# Patient Record
Sex: MALE | Race: WHITE | NOT HISPANIC OR LATINO | Employment: FULL TIME | URBAN - METROPOLITAN AREA
[De-identification: names, ages, dates, MRNs, and addresses within clinical notes are randomized per-mention and may not be internally consistent; named-entity substitution may affect disease eponyms.]

---

## 2021-06-25 ENCOUNTER — HOSPITAL ENCOUNTER (OUTPATIENT)
Dept: LAB | Facility: MEDICAL CENTER | Age: 36
End: 2021-06-25
Attending: STUDENT IN AN ORGANIZED HEALTH CARE EDUCATION/TRAINING PROGRAM
Payer: COMMERCIAL

## 2021-06-25 LAB
ALBUMIN SERPL BCP-MCNC: 4.6 G/DL (ref 3.2–4.9)
ALBUMIN/GLOB SERPL: 2.3 G/DL
ALP SERPL-CCNC: 64 U/L (ref 30–99)
ALT SERPL-CCNC: 21 U/L (ref 2–50)
ANION GAP SERPL CALC-SCNC: 9 MMOL/L (ref 7–16)
AST SERPL-CCNC: 22 U/L (ref 12–45)
BILIRUB SERPL-MCNC: 0.3 MG/DL (ref 0.1–1.5)
BUN SERPL-MCNC: 13 MG/DL (ref 8–22)
CALCIUM SERPL-MCNC: 9.2 MG/DL (ref 8.5–10.5)
CHLORIDE SERPL-SCNC: 108 MMOL/L (ref 96–112)
CHOLEST SERPL-MCNC: 144 MG/DL (ref 100–199)
CO2 SERPL-SCNC: 28 MMOL/L (ref 20–33)
CREAT SERPL-MCNC: 1.07 MG/DL (ref 0.5–1.4)
ERYTHROCYTE [DISTWIDTH] IN BLOOD BY AUTOMATED COUNT: 41.4 FL (ref 35.9–50)
FASTING STATUS PATIENT QL REPORTED: NORMAL
GLOBULIN SER CALC-MCNC: 2 G/DL (ref 1.9–3.5)
GLUCOSE SERPL-MCNC: 101 MG/DL (ref 65–99)
HCT VFR BLD AUTO: 49.6 % (ref 42–52)
HDLC SERPL-MCNC: 61 MG/DL
HGB BLD-MCNC: 16.3 G/DL (ref 14–18)
LDLC SERPL CALC-MCNC: 76 MG/DL
MCH RBC QN AUTO: 31.2 PG (ref 27–33)
MCHC RBC AUTO-ENTMCNC: 32.9 G/DL (ref 33.7–35.3)
MCV RBC AUTO: 94.8 FL (ref 81.4–97.8)
PLATELET # BLD AUTO: 204 K/UL (ref 164–446)
PMV BLD AUTO: 11.7 FL (ref 9–12.9)
POTASSIUM SERPL-SCNC: 4.4 MMOL/L (ref 3.6–5.5)
PROT SERPL-MCNC: 6.6 G/DL (ref 6–8.2)
RBC # BLD AUTO: 5.23 M/UL (ref 4.7–6.1)
SODIUM SERPL-SCNC: 145 MMOL/L (ref 135–145)
TRIGL SERPL-MCNC: 34 MG/DL (ref 0–149)
WBC # BLD AUTO: 6.3 K/UL (ref 4.8–10.8)

## 2021-06-25 PROCEDURE — 85027 COMPLETE CBC AUTOMATED: CPT

## 2021-06-25 PROCEDURE — 80053 COMPREHEN METABOLIC PANEL: CPT

## 2021-06-25 PROCEDURE — 80061 LIPID PANEL: CPT

## 2021-06-25 PROCEDURE — 36415 COLL VENOUS BLD VENIPUNCTURE: CPT

## 2021-11-20 ENCOUNTER — HOSPITAL ENCOUNTER (OUTPATIENT)
Dept: LAB | Facility: MEDICAL CENTER | Age: 36
End: 2021-11-20
Attending: STUDENT IN AN ORGANIZED HEALTH CARE EDUCATION/TRAINING PROGRAM
Payer: COMMERCIAL

## 2021-11-20 LAB
BASOPHILS # BLD AUTO: 0.4 % (ref 0–1.8)
BASOPHILS # BLD: 0.03 K/UL (ref 0–0.12)
CRP SERPL HS-MCNC: <0.3 MG/DL (ref 0–0.75)
EOSINOPHIL # BLD AUTO: 0.05 K/UL (ref 0–0.51)
EOSINOPHIL NFR BLD: 0.7 % (ref 0–6.9)
ERYTHROCYTE [DISTWIDTH] IN BLOOD BY AUTOMATED COUNT: 41 FL (ref 35.9–50)
ERYTHROCYTE [SEDIMENTATION RATE] IN BLOOD BY WESTERGREN METHOD: 2 MM/HOUR (ref 0–20)
HCT VFR BLD AUTO: 49.4 % (ref 42–52)
HGB BLD-MCNC: 17 G/DL (ref 14–18)
IMM GRANULOCYTES # BLD AUTO: 0.01 K/UL (ref 0–0.11)
IMM GRANULOCYTES NFR BLD AUTO: 0.1 % (ref 0–0.9)
LYMPHOCYTES # BLD AUTO: 1.91 K/UL (ref 1–4.8)
LYMPHOCYTES NFR BLD: 28 % (ref 22–41)
MCH RBC QN AUTO: 31.8 PG (ref 27–33)
MCHC RBC AUTO-ENTMCNC: 34.4 G/DL (ref 33.7–35.3)
MCV RBC AUTO: 92.5 FL (ref 81.4–97.8)
MONOCYTES # BLD AUTO: 0.62 K/UL (ref 0–0.85)
MONOCYTES NFR BLD AUTO: 9.1 % (ref 0–13.4)
NEUTROPHILS # BLD AUTO: 4.21 K/UL (ref 1.82–7.42)
NEUTROPHILS NFR BLD: 61.7 % (ref 44–72)
NRBC # BLD AUTO: 0 K/UL
NRBC BLD-RTO: 0 /100 WBC
PLATELET # BLD AUTO: 256 K/UL (ref 164–446)
PMV BLD AUTO: 11.7 FL (ref 9–12.9)
RBC # BLD AUTO: 5.34 M/UL (ref 4.7–6.1)
RHEUMATOID FACT SER IA-ACNC: <10 IU/ML (ref 0–14)
URATE SERPL-MCNC: 5.3 MG/DL (ref 2.5–8.3)
WBC # BLD AUTO: 6.8 K/UL (ref 4.8–10.8)

## 2021-11-20 PROCEDURE — 86140 C-REACTIVE PROTEIN: CPT

## 2021-11-20 PROCEDURE — 86812 HLA TYPING A B OR C: CPT

## 2021-11-20 PROCEDURE — 85652 RBC SED RATE AUTOMATED: CPT

## 2021-11-20 PROCEDURE — 86200 CCP ANTIBODY: CPT

## 2021-11-20 PROCEDURE — 86038 ANTINUCLEAR ANTIBODIES: CPT

## 2021-11-20 PROCEDURE — 85025 COMPLETE CBC W/AUTO DIFF WBC: CPT

## 2021-11-20 PROCEDURE — 86431 RHEUMATOID FACTOR QUANT: CPT

## 2021-11-20 PROCEDURE — 36415 COLL VENOUS BLD VENIPUNCTURE: CPT

## 2021-11-20 PROCEDURE — 84550 ASSAY OF BLOOD/URIC ACID: CPT

## 2021-11-23 LAB
CCP IGG SERPL-ACNC: 3 UNITS (ref 0–19)
HLA-B27 QL FC: NORMAL
NUCLEAR IGG SER QL IA: NORMAL

## 2021-12-28 ENCOUNTER — HOSPITAL ENCOUNTER (EMERGENCY)
Facility: MEDICAL CENTER | Age: 36
End: 2021-12-28
Attending: EMERGENCY MEDICINE
Payer: COMMERCIAL

## 2021-12-28 ENCOUNTER — HOSPITAL ENCOUNTER (EMERGENCY)
Facility: MEDICAL CENTER | Age: 36
End: 2021-12-28
Payer: COMMERCIAL

## 2021-12-28 VITALS
RESPIRATION RATE: 16 BRPM | HEIGHT: 71 IN | BODY MASS INDEX: 21.11 KG/M2 | TEMPERATURE: 98.2 F | SYSTOLIC BLOOD PRESSURE: 121 MMHG | HEART RATE: 62 BPM | WEIGHT: 150.79 LBS | OXYGEN SATURATION: 98 % | DIASTOLIC BLOOD PRESSURE: 72 MMHG

## 2021-12-28 VITALS
RESPIRATION RATE: 14 BRPM | SYSTOLIC BLOOD PRESSURE: 106 MMHG | DIASTOLIC BLOOD PRESSURE: 72 MMHG | HEART RATE: 84 BPM | OXYGEN SATURATION: 96 % | TEMPERATURE: 97.5 F

## 2021-12-28 DIAGNOSIS — B02.9 HERPES ZOSTER WITHOUT COMPLICATION: ICD-10-CM

## 2021-12-28 PROCEDURE — 99283 EMERGENCY DEPT VISIT LOW MDM: CPT

## 2021-12-28 PROCEDURE — 700101 HCHG RX REV CODE 250: Performed by: EMERGENCY MEDICINE

## 2021-12-28 PROCEDURE — 302449 STATCHG TRIAGE ONLY (STATISTIC)

## 2021-12-28 RX ORDER — PROPARACAINE HYDROCHLORIDE 5 MG/ML
1 SOLUTION/ DROPS OPHTHALMIC ONCE
Status: COMPLETED | OUTPATIENT
Start: 2021-12-28 | End: 2021-12-28

## 2021-12-28 RX ADMIN — PROPARACAINE HYDROCHLORIDE 1 DROP: 5 SOLUTION/ DROPS OPHTHALMIC at 14:00

## 2021-12-28 RX ADMIN — FLUORESCEIN SODIUM 1 MG: 1 STRIP OPHTHALMIC at 14:00

## 2021-12-28 ASSESSMENT — FIBROSIS 4 INDEX: FIB4 SCORE: 0.64

## 2021-12-28 NOTE — DISCHARGE INSTRUCTIONS
Call Dr. Hicks office today to make an appointment for tomorrow so they can follow this to ensure that there is no involvement of the eye.    Return for any change or worsening symptoms

## 2021-12-28 NOTE — ED TRIAGE NOTES
Shingles and currently on steroids and antivirals. Told to go to ER if affecting eye. Left eye affected.

## 2021-12-28 NOTE — ED PROVIDER NOTES
"ED Provider  Scribed for Juan Pablo Leos D.O. by Jovana Muhammad. 12/28/2021  1:34 PM    Means of arrival:walk-in  History obtained from:patient  History limited by: none    CHIEF COMPLAINT  Chief Complaint   Patient presents with    Herpes Zoster     Shingles to left face and now affecting left eye       [facility]  Cosme Zee is a 36 y.o. male who presents for evaluation of shingles exacerbation onset 4 days ago. Four days ago, Cosme developed a rash on his face. He went to Urgent care who gave him antivirals and prednisone for his shingles. He notes that the rash was painful but since taking his antivirals and steroid it has alleviated most of the pain. He was told that if it starts to affect his eyes he should come into the ED. Cosme has shingles rash on the left side of his face it is now right under his left eye. The rash is more puffy today according to Cosme. He denies any vision changes. He denies associated symptoms nausea, vomiting cough, fever, or chills. No exacerbating factors were reported.     REVIEW OF SYSTEMS  See HPI for further details. All other systems are negative.     PAST MEDICAL HISTORY       SOCIAL HISTORY  Social History     Tobacco Use    Smoking status: Never Smoker    Smokeless tobacco: Not reported   Vaping Use    Vaping Use: Never used   Substance and Sexual Activity    Alcohol use: Not Currently    Drug use: Never    Sexual activity: Not reported       SURGICAL HISTORY  patient denies any surgical history    CURRENT MEDICATIONS  Home Medications    **Home medications have not yet been reviewed for this encounter**         ALLERGIES  No Known Allergies    PHYSICAL EXAM  VITAL SIGNS: /69   Pulse 64   Temp 36.4 °C (97.5 °F) (Temporal)   Resp 16   Ht 1.803 m (5' 11\")   Wt 68.4 kg (150 lb 12.7 oz)   SpO2 97%   BMI 21.03 kg/m²   Constitutional: Alert in no apparent distress.  HENT: No signs of trauma, mucous membranes are moist. Erythematous vesicular  rash extending from " his left temple to the left maxillary, to his nose, and upper left eye  Eyes: Conjunctiva normal, Non-icteric. Pupil Equal and reactive, EOMI, Slit lamp exam shows anterior chamber deep and clear, No fluorescin dye uptake, No signs of lesions on the eye  Neck: Normal range of motion, No tenderness, Supple.  Lymphatic: No lymphadenopathy noted.   Cardiovascular: Regular rate and rhythm, no murmurs.   Thorax & Lungs: Normal breath sounds, No respiratory distress, No wheezing, No chest tenderness.   Abdomen: Bowel sounds normal, Soft, No tenderness, No masses, No pulsatile masses. No peritoneal signs.  Skin: Warm, Dry, normal color.   Back: No bony tenderness, No CVA tenderness.   Extremities: No edema, No tenderness, No cyanosis  Musculoskeletal: Good range of motion in all major joints. No tenderness to palpation or major deformities noted.   Neurologic: Alert and oriented x4, Normal motor function, Normal sensory function, No focal deficits noted.   Psychiatric: Affect normal, Judgment normal, Mood normal.     COURSE  Pertinent Labs & Imaging studies reviewed. (See chart for details)    1:34 PM - Patient seen and examined at bedside. Discussed plan of care. The patient will be  medicated with Fluorescein and Proparacaine 0.5% one drop for the slit eye exam. I informed the patient that his eye looks good. I will consult the ophthalmologist on call to see if they recommend anything else. Patient verbalizes understanding and agreement to this plan of care.      2:10 PM I discussed the patient's case and the above findings with Dr. Beaulieu (Ophthalmologist) who agrees to follow up with the patient tomorrow.    2:14 PM Patient was reevaluated at bedside. I informed the patient that I spoke to Dr. Beaulieu who wants to follow up with the patient tomorrow. Discussed plans for discharge. Patient verbalizes understanding and agreement to this plan of care.        MEDICAL DECISION MAKING  This is a 36 y.o. male who presents  shingles to the left face.  There is left periorbital swelling and lesions, his slit-lamp exam shows no dendritic lesions, no dye uptake he has no pain in the eye.  I spoke with ophthalmology and the patient will receive a close outpatient follow-up to evaluate for any ophthalmological emergencies.      The patient will return for new or worsening symptoms and is stable at the time of discharge.    The patient is referred to a primary physician for blood pressure management, diabetic screening, and for all other preventative health concerns.    DISPOSITION:  Patient will be discharged home in stable condition.    FOLLOW UP:  Anil Beaulieu M.D.  [address] [facility] [id number]  [facility] [address]  [phone]    In 1 day      FINAL IMPRESSION  1. Herpes zoster without complication         Jovana TOM (Scribe), am scribing for, and in the presence of, Juan Pablo Leos D.O..    Electronically signed by: Jovana Muhammad (Scribe), 12/28/2021    Juan Pablo TOM D.O. personally performed the services described in this documentation, as scribed by Jovana Muhammad in my presence, and it is both accurate and complete.    The note accurately reflects work and decisions made by me.  Juan Pablo Leos D.O.  12/28/2021  5:16 PM

## 2021-12-28 NOTE — ED NOTES
Pt sitting up in bed, family at bedside. Pt denies any pain or vision changes to L eye at this time.

## 2022-03-12 ENCOUNTER — HOSPITAL ENCOUNTER (OUTPATIENT)
Dept: LAB | Facility: MEDICAL CENTER | Age: 37
End: 2022-03-12
Attending: STUDENT IN AN ORGANIZED HEALTH CARE EDUCATION/TRAINING PROGRAM
Payer: COMMERCIAL

## 2022-03-12 LAB
ALBUMIN SERPL BCP-MCNC: 5 G/DL (ref 3.2–4.9)
ALBUMIN/GLOB SERPL: 2.9 G/DL
ALP SERPL-CCNC: 71 U/L (ref 30–99)
ALT SERPL-CCNC: 91 U/L (ref 2–50)
ANION GAP SERPL CALC-SCNC: 12 MMOL/L (ref 7–16)
AST SERPL-CCNC: 46 U/L (ref 12–45)
BILIRUB SERPL-MCNC: 0.4 MG/DL (ref 0.1–1.5)
BUN SERPL-MCNC: 17 MG/DL (ref 8–22)
CALCIUM SERPL-MCNC: 9.5 MG/DL (ref 8.5–10.5)
CHLORIDE SERPL-SCNC: 104 MMOL/L (ref 96–112)
CO2 SERPL-SCNC: 24 MMOL/L (ref 20–33)
CREAT SERPL-MCNC: 1.07 MG/DL (ref 0.5–1.4)
ERYTHROCYTE [SEDIMENTATION RATE] IN BLOOD BY WESTERGREN METHOD: 2 MM/HOUR (ref 0–20)
GLOBULIN SER CALC-MCNC: 1.7 G/DL (ref 1.9–3.5)
GLUCOSE SERPL-MCNC: 93 MG/DL (ref 65–99)
POTASSIUM SERPL-SCNC: 4.7 MMOL/L (ref 3.6–5.5)
PROT SERPL-MCNC: 6.7 G/DL (ref 6–8.2)
SODIUM SERPL-SCNC: 140 MMOL/L (ref 135–145)

## 2022-03-12 PROCEDURE — 85652 RBC SED RATE AUTOMATED: CPT

## 2022-03-12 PROCEDURE — 80053 COMPREHEN METABOLIC PANEL: CPT

## 2022-03-12 PROCEDURE — 36415 COLL VENOUS BLD VENIPUNCTURE: CPT

## 2022-03-12 PROCEDURE — 86038 ANTINUCLEAR ANTIBODIES: CPT

## 2022-03-15 LAB — NUCLEAR IGG SER QL IA: NORMAL

## 2022-03-29 ENCOUNTER — OFFICE VISIT (OUTPATIENT)
Dept: URGENT CARE | Facility: PHYSICIAN GROUP | Age: 37
End: 2022-03-29
Payer: COMMERCIAL

## 2022-03-29 VITALS
DIASTOLIC BLOOD PRESSURE: 70 MMHG | OXYGEN SATURATION: 98 % | SYSTOLIC BLOOD PRESSURE: 112 MMHG | TEMPERATURE: 97.9 F | HEART RATE: 70 BPM | WEIGHT: 146 LBS | RESPIRATION RATE: 16 BRPM | HEIGHT: 71 IN | BODY MASS INDEX: 20.44 KG/M2

## 2022-03-29 DIAGNOSIS — R10.9 RIGHT SIDED ABDOMINAL PAIN: ICD-10-CM

## 2022-03-29 LAB
APPEARANCE UR: CLEAR
BILIRUB UR STRIP-MCNC: NEGATIVE MG/DL
COLOR UR AUTO: YELLOW
GLUCOSE UR STRIP.AUTO-MCNC: NEGATIVE MG/DL
KETONES UR STRIP.AUTO-MCNC: NEGATIVE MG/DL
LEUKOCYTE ESTERASE UR QL STRIP.AUTO: NEGATIVE
NITRITE UR QL STRIP.AUTO: NEGATIVE
PH UR STRIP.AUTO: 7.5 [PH] (ref 5–8)
PROT UR QL STRIP: NEGATIVE MG/DL
RBC UR QL AUTO: NEGATIVE
SP GR UR STRIP.AUTO: 1.02
UROBILINOGEN UR STRIP-MCNC: 0.2 MG/DL

## 2022-03-29 PROCEDURE — 99204 OFFICE O/P NEW MOD 45 MIN: CPT | Performed by: PHYSICIAN ASSISTANT

## 2022-03-29 PROCEDURE — 81002 URINALYSIS NONAUTO W/O SCOPE: CPT | Performed by: PHYSICIAN ASSISTANT

## 2022-03-29 ASSESSMENT — ENCOUNTER SYMPTOMS
ABDOMINAL PAIN: 1
PALPITATIONS: 0
DIARRHEA: 0
FLANK PAIN: 0
NAUSEA: 0
VOMITING: 0
BLOOD IN STOOL: 0
CONSTIPATION: 0

## 2022-03-29 ASSESSMENT — PAIN SCALES - GENERAL: PAINLEVEL: 3=SLIGHT PAIN

## 2022-03-29 ASSESSMENT — FIBROSIS 4 INDEX: FIB4 SCORE: 0.64

## 2022-03-30 ENCOUNTER — HOSPITAL ENCOUNTER (OUTPATIENT)
Dept: RADIOLOGY | Facility: MEDICAL CENTER | Age: 37
End: 2022-03-30
Attending: PHYSICIAN ASSISTANT
Payer: COMMERCIAL

## 2022-03-30 DIAGNOSIS — R10.9 RIGHT SIDED ABDOMINAL PAIN: ICD-10-CM

## 2022-03-30 PROCEDURE — 76705 ECHO EXAM OF ABDOMEN: CPT

## 2022-03-30 NOTE — PROGRESS NOTES
"  Subjective:   Cosme Zee is a 36 y.o. male who presents today with   Chief Complaint   Patient presents with   • Abdominal Pain     Right side abdominal pain for 2 weeks.        Abdominal Pain  This is a new problem. The current episode started 1 to 4 weeks ago. The problem occurs constantly. The problem has been unchanged. The pain is located in the RLQ. The pain is mild. Pertinent negatives include no constipation, diarrhea, dysuria, frequency, hematuria, melena, nausea or vomiting. The pain is aggravated by certain positions. The pain is relieved by nothing. He has tried nothing for the symptoms.   Patient states he is very active and does rockclimbing.  He states when it initially started it was sharp pain when he was sitting and since then has been more of a dull pain to the area.  No worse with eating or drinking.  PMH:  has no past medical history on file.  MEDS: No current outpatient medications on file.  ALLERGIES: No Known Allergies  SURGHX: History reviewed. No pertinent surgical history.  SOCHX:  reports that he has never smoked. He has never used smokeless tobacco. He reports previous alcohol use. He reports that he does not use drugs.  FH: Reviewed with patient, not pertinent to this visit.     Review of Systems   Cardiovascular: Negative for chest pain and palpitations.   Gastrointestinal: Positive for abdominal pain. Negative for blood in stool, constipation, diarrhea, melena, nausea and vomiting.   Genitourinary: Negative for dysuria, flank pain, frequency, hematuria and urgency.      Objective:   /70   Pulse 70   Temp 36.6 °C (97.9 °F) (Temporal)   Resp 16   Ht 1.803 m (5' 11\")   Wt 66.2 kg (146 lb)   SpO2 98%   BMI 20.36 kg/m²   Physical Exam  Vitals and nursing note reviewed.   Constitutional:       General: He is not in acute distress.     Appearance: Normal appearance. He is well-developed. He is not ill-appearing or toxic-appearing.   HENT:      Head: Normocephalic and " atraumatic.      Right Ear: Hearing normal.      Left Ear: Hearing normal.   Eyes:      Pupils: Pupils are equal, round, and reactive to light.   Cardiovascular:      Rate and Rhythm: Normal rate and regular rhythm.      Heart sounds: Normal heart sounds.   Pulmonary:      Effort: Pulmonary effort is normal.   Abdominal:      Hernia: A hernia is present. Hernia is present in the ventral area.          Comments: Palpable bump to the right lower quadrant just outside the umbilical area.   Musculoskeletal:      Comments: Normal movement in all 4 extremities   Skin:     General: Skin is warm and dry.   Neurological:      Mental Status: He is alert.      Coordination: Coordination normal.   Psychiatric:         Mood and Affect: Mood normal.         UA negative  US pending    Assessment/Plan:   Assessment    1. Right sided abdominal pain  - POCT Urinalysis  - US-HERNIA ABDOMEN; Future  We will rule out any signs of hernia with ultrasound.  Follow-up with primary care ultrasound was negative and signs and symptoms or not improving.  Has follow-up on Friday with his primary care for labs at that time.  Unable to order ultrasound for today given late presentation.  Labs reviewed from 2 weeks ago and did not show any concerning findings that would be related.  Differential diagnosis, natural history, supportive care, and indications for immediate follow-up discussed.   Patient given instructions and understanding of medications and treatment.    If not improving in 3-5 days, F/U with PCP or return to  if symptoms worsen.    Patient agreeable to plan.      Please note that this dictation was created using voice recognition software. I have made every reasonable attempt to correct obvious errors, but I expect that there are errors of grammar and possibly content that I did not discover before finalizing the note.    Matt Perkins PA-C

## 2022-06-06 ENCOUNTER — OFFICE VISIT (OUTPATIENT)
Dept: URGENT CARE | Facility: CLINIC | Age: 37
End: 2022-06-06
Payer: COMMERCIAL

## 2022-06-06 VITALS
HEART RATE: 74 BPM | BODY MASS INDEX: 20.44 KG/M2 | OXYGEN SATURATION: 97 % | DIASTOLIC BLOOD PRESSURE: 70 MMHG | RESPIRATION RATE: 16 BRPM | SYSTOLIC BLOOD PRESSURE: 116 MMHG | TEMPERATURE: 97.3 F | WEIGHT: 146 LBS | HEIGHT: 71 IN

## 2022-06-06 DIAGNOSIS — R10.12 LUQ PAIN: ICD-10-CM

## 2022-06-06 DIAGNOSIS — K29.00 ACUTE GASTRITIS, PRESENCE OF BLEEDING UNSPECIFIED, UNSPECIFIED GASTRITIS TYPE: ICD-10-CM

## 2022-06-06 PROCEDURE — 99213 OFFICE O/P EST LOW 20 MIN: CPT | Performed by: NURSE PRACTITIONER

## 2022-06-06 RX ORDER — PANTOPRAZOLE SODIUM 40 MG/1
40 TABLET, DELAYED RELEASE ORAL DAILY
Qty: 14 TABLET | Refills: 1 | Status: SHIPPED | OUTPATIENT
Start: 2022-06-06 | End: 2022-06-20

## 2022-06-06 RX ORDER — SUCRALFATE 1 G/1
1 TABLET ORAL
Qty: 56 TABLET | Refills: 1 | Status: SHIPPED | OUTPATIENT
Start: 2022-06-06 | End: 2022-06-20

## 2022-06-06 ASSESSMENT — ENCOUNTER SYMPTOMS
RESPIRATORY NEGATIVE: 1
ABDOMINAL PAIN: 1
COUGH: 0
NAUSEA: 0
CONSTIPATION: 0
CHILLS: 0
CONSTITUTIONAL NEGATIVE: 1
SHORTNESS OF BREATH: 0
CARDIOVASCULAR NEGATIVE: 1
VOMITING: 0
HEARTBURN: 0
DIARRHEA: 0
FEVER: 0

## 2022-06-06 ASSESSMENT — FIBROSIS 4 INDEX: FIB4 SCORE: 0.64

## 2022-06-07 NOTE — PROGRESS NOTES
Subjective:     Cosme Zee is a 36 y.o. male who presents for LUQ Pain (X 2 wks and worse today, Lt. Upper abdominal pain.  No injury.)       LUQ Pain  This is a new problem. The problem has been gradually worsening. Pertinent negatives include no constipation, diarrhea, fever, nausea or vomiting.     Patient reporting left upper quadrant abdominal pain which radiates to his back.  Started 2 weeks ago.  Denies injury.  No nausea, vomiting, constipation, diarrhea, fever, chills, chest pain, shortness of breath, or other symptoms.    Seen in urgent care in March.  Had ultrasound of right lower quadrant due to pain.  Negative for hernia.    Patient was screened prior to rooming and denied COVID-19 diagnosis or contact with a person who has been diagnosed or is suspected to have COVID-19. During this visit, appropriate PPE was worn, hand hygiene was performed, and the patient and any visitors were masked.     PMH:  has no past medical history on file.    MEDS:   Current Outpatient Medications:   •  pantoprazole (PROTONIX) 40 MG Tablet Delayed Response, Take 1 Tablet by mouth every day for 14 days., Disp: 14 Tablet, Rfl: 1  •  sucralfate (CARAFATE) 1 GM Tab, Take 1 Tablet by mouth 4 Times a Day,Before Meals and at Bedtime for 14 days., Disp: 56 Tablet, Rfl: 1    ALLERGIES: No Known Allergies    SURGHX: History reviewed. No pertinent surgical history.    SOCHX:  reports that he has never smoked. He has never used smokeless tobacco. He reports previous alcohol use. He reports that he does not use drugs.     FH: Reviewed with patient, not pertinent to this visit.    Review of Systems   Constitutional: Negative.  Negative for chills, fever and malaise/fatigue.   Respiratory: Negative.  Negative for cough and shortness of breath.    Cardiovascular: Negative.  Negative for chest pain.   Gastrointestinal: Positive for abdominal pain. Negative for constipation, diarrhea, heartburn, nausea and vomiting.   All other  "systems reviewed and are negative.    Additional details per HPI.      Objective:     /70 (BP Location: Left arm, Patient Position: Sitting, BP Cuff Size: Adult)   Pulse 74   Temp 36.3 °C (97.3 °F) (Temporal)   Resp 16   Ht 1.803 m (5' 11\")   Wt 66.2 kg (146 lb)   SpO2 97%   BMI 20.36 kg/m²     Physical Exam  Vitals reviewed.   Constitutional:       General: He is not in acute distress.     Appearance: He is well-developed. He is not ill-appearing or toxic-appearing.   Eyes:      General: Vision grossly intact.      Extraocular Movements: Extraocular movements intact.   Cardiovascular:      Rate and Rhythm: Normal rate.   Pulmonary:      Effort: Pulmonary effort is normal. No respiratory distress.   Abdominal:      General: There is no distension.      Palpations: Abdomen is soft.      Tenderness: There is no abdominal tenderness.   Musculoskeletal:         General: No deformity. Normal range of motion.      Cervical back: Normal range of motion.   Skin:     General: Skin is warm and dry.      Coloration: Skin is not pale.   Neurological:      Mental Status: He is alert and oriented to person, place, and time.      Sensory: No sensory deficit.      Motor: No weakness.   Psychiatric:         Behavior: Behavior normal. Behavior is cooperative.       Assessment/Plan:     1. LUQ pain  - pantoprazole (PROTONIX) 40 MG Tablet Delayed Response; Take 1 Tablet by mouth every day for 14 days.  Dispense: 14 Tablet; Refill: 1  - sucralfate (CARAFATE) 1 GM Tab; Take 1 Tablet by mouth 4 Times a Day,Before Meals and at Bedtime for 14 days.  Dispense: 56 Tablet; Refill: 1    2. Acute gastritis, presence of bleeding unspecified, unspecified gastritis type  - pantoprazole (PROTONIX) 40 MG Tablet Delayed Response; Take 1 Tablet by mouth every day for 14 days.  Dispense: 14 Tablet; Refill: 1  - sucralfate (CARAFATE) 1 GM Tab; Take 1 Tablet by mouth 4 Times a Day,Before Meals and at Bedtime for 14 days.  Dispense: 56 Tablet; " Refill: 1    GI cocktail given. Patient reports improvement in symptoms.    Rx as above sent electronically.     Avoid/reduce alcohol, ibuprofen, spicy foods, caffeine, etc.    Differential diagnosis, natural history, supportive care, over-the-counter symptom management per 's instructions, close monitoring, and indications for immediate follow-up discussed.     All questions answered. Patient agrees with the plan of care.    Discharge summary provided.

## 2022-06-12 PROBLEM — E65 FAT PAD SYNDROME: Status: ACTIVE | Noted: 2021-09-23

## 2022-06-12 ASSESSMENT — VISUAL ACUITY: OU: 1

## 2024-06-03 ENCOUNTER — OFFICE VISIT (OUTPATIENT)
Dept: URGENT CARE | Facility: CLINIC | Age: 39
End: 2024-06-03
Payer: COMMERCIAL

## 2024-06-03 VITALS
WEIGHT: 155.65 LBS | HEART RATE: 65 BPM | SYSTOLIC BLOOD PRESSURE: 114 MMHG | RESPIRATION RATE: 14 BRPM | HEIGHT: 71 IN | OXYGEN SATURATION: 98 % | DIASTOLIC BLOOD PRESSURE: 70 MMHG | BODY MASS INDEX: 21.79 KG/M2 | TEMPERATURE: 98 F

## 2024-06-03 DIAGNOSIS — S39.011A STRAIN OF ABDOMINAL MUSCLE, INITIAL ENCOUNTER: ICD-10-CM

## 2024-06-03 PROCEDURE — 99213 OFFICE O/P EST LOW 20 MIN: CPT | Performed by: FAMILY MEDICINE

## 2024-06-03 PROCEDURE — 3078F DIAST BP <80 MM HG: CPT | Performed by: FAMILY MEDICINE

## 2024-06-03 PROCEDURE — 3074F SYST BP LT 130 MM HG: CPT | Performed by: FAMILY MEDICINE

## 2024-06-03 ASSESSMENT — ENCOUNTER SYMPTOMS
CONSTITUTIONAL NEGATIVE: 1
ABDOMINAL PAIN: 1

## 2024-06-03 NOTE — PROGRESS NOTES
"Subjective:   Cosme Zee is a 38 y.o. male who presents for Bump (X 3 weeks, Left lower abdomen, tenderness )      Rock climbing pulled left abdominal muscles, continues to rock climb with the pain.  No n/v, no bruising or bulging in that area        Review of Systems   Constitutional: Negative.    Gastrointestinal:  Positive for abdominal pain.       Medications, Allergies, and current problem list reviewed today in Epic.     Objective:     /70   Pulse 65   Temp 36.7 °C (98 °F) (Temporal)   Resp 14   Ht 1.803 m (5' 11\")   Wt 70.6 kg (155 lb 10.3 oz)   SpO2 98%     Physical Exam  Vitals and nursing note reviewed.   Constitutional:       Appearance: Normal appearance.   Cardiovascular:      Rate and Rhythm: Normal rate and regular rhythm.      Pulses: Normal pulses.      Heart sounds: Normal heart sounds.   Pulmonary:      Effort: Pulmonary effort is normal.      Breath sounds: Normal breath sounds.   Abdominal:      General: Abdomen is flat. Bowel sounds are normal. There is no distension.      Palpations: Abdomen is soft. There is no mass.      Tenderness: There is abdominal tenderness. There is no right CVA tenderness, left CVA tenderness, guarding or rebound.      Hernia: No hernia is present.   Neurological:      Mental Status: He is alert.         Assessment/Plan:     Diagnosis and associated orders:     1. Strain of abdominal muscle, initial encounter           Comments/MDM:     Rest, ibuprofen         Differential diagnosis, natural history, supportive care, and indications for immediate follow-up discussed.    Advised the patient to follow-up with the primary care physician for recheck, reevaluation, and consideration of further management.    Please note that this dictation was created using voice recognition software. I have made a reasonable attempt to correct obvious errors, but I expect that there are errors of grammar and possibly content that I did not discover before finalizing " the note.    This note was electronically signed by Chidi Castillo M.D.

## 2025-01-20 ENCOUNTER — APPOINTMENT (OUTPATIENT)
Dept: URBAN - METROPOLITAN AREA CLINIC 6 | Facility: CLINIC | Age: 40
Setting detail: DERMATOLOGY
End: 2025-01-20

## 2025-01-20 DIAGNOSIS — Z71.89 OTHER SPECIFIED COUNSELING: ICD-10-CM

## 2025-01-20 DIAGNOSIS — L72.0 EPIDERMAL CYST: ICD-10-CM

## 2025-01-20 DIAGNOSIS — L73.8 OTHER SPECIFIED FOLLICULAR DISORDERS: ICD-10-CM

## 2025-01-20 PROBLEM — D23.39 OTHER BENIGN NEOPLASM OF SKIN OF OTHER PARTS OF FACE: Status: ACTIVE | Noted: 2025-01-20

## 2025-01-20 PROCEDURE — 99203 OFFICE O/P NEW LOW 30 MIN: CPT | Mod: 25

## 2025-01-20 PROCEDURE — ? BENIGN DESTRUCTION

## 2025-01-20 PROCEDURE — 17110 DESTRUCTION B9 LES UP TO 14: CPT

## 2025-01-20 PROCEDURE — ? COUNSELING

## 2025-01-20 ASSESSMENT — LOCATION DETAILED DESCRIPTION DERM
LOCATION DETAILED: LEFT INFERIOR LATERAL FOREHEAD
LOCATION DETAILED: LEFT CENTRAL MALAR CHEEK
LOCATION DETAILED: RIGHT CENTRAL MALAR CHEEK
LOCATION DETAILED: LEFT SUPERIOR MEDIAL MALAR CHEEK

## 2025-01-20 ASSESSMENT — LOCATION ZONE DERM: LOCATION ZONE: FACE

## 2025-01-20 ASSESSMENT — LOCATION SIMPLE DESCRIPTION DERM
LOCATION SIMPLE: LEFT FOREHEAD
LOCATION SIMPLE: LEFT CHEEK
LOCATION SIMPLE: RIGHT CHEEK

## 2025-01-20 NOTE — PROCEDURE: BENIGN DESTRUCTION
Render Note In Bullet Format When Appropriate: No
Detail Level: Detailed
Medical Necessity Clause: This procedure was medically necessary because the lesions that were treated were:
Consent: The patient's consent was obtained including but not limited to risks of crusting, scabbing, blistering, scarring, darker or lighter pigmentary change, recurrence, incomplete removal and infection.
Post-Care Instructions: I reviewed with the patient in detail post-care instructions. Patient is to wear sunprotection, and avoid picking at any of the treated lesions. Pt may apply Vaseline to crusted or scabbing areas.
Anesthesia Volume In Cc: 0.5
Treatment Number (Will Not Render If 0): 0
Medical Necessity Information: It is in your best interest to select a reason for this procedure from the list below. All of these items fulfill various CMS LCD requirements except the new and changing color options.

## 2025-08-24 SDOH — ECONOMIC STABILITY: FOOD INSECURITY: WITHIN THE PAST 12 MONTHS, YOU WORRIED THAT YOUR FOOD WOULD RUN OUT BEFORE YOU GOT MONEY TO BUY MORE.: NEVER TRUE

## 2025-08-24 SDOH — ECONOMIC STABILITY: INCOME INSECURITY: IN THE LAST 12 MONTHS, WAS THERE A TIME WHEN YOU WERE NOT ABLE TO PAY THE MORTGAGE OR RENT ON TIME?: NO

## 2025-08-24 SDOH — HEALTH STABILITY: MENTAL HEALTH
STRESS IS WHEN SOMEONE FEELS TENSE, NERVOUS, ANXIOUS, OR CAN'T SLEEP AT NIGHT BECAUSE THEIR MIND IS TROUBLED. HOW STRESSED ARE YOU?: TO SOME EXTENT

## 2025-08-24 SDOH — ECONOMIC STABILITY: INCOME INSECURITY: HOW HARD IS IT FOR YOU TO PAY FOR THE VERY BASICS LIKE FOOD, HOUSING, MEDICAL CARE, AND HEATING?: NOT HARD AT ALL

## 2025-08-24 SDOH — ECONOMIC STABILITY: FOOD INSECURITY: WITHIN THE PAST 12 MONTHS, THE FOOD YOU BOUGHT JUST DIDN'T LAST AND YOU DIDN'T HAVE MONEY TO GET MORE.: NEVER TRUE

## 2025-08-24 SDOH — HEALTH STABILITY: PHYSICAL HEALTH: ON AVERAGE, HOW MANY DAYS PER WEEK DO YOU ENGAGE IN MODERATE TO STRENUOUS EXERCISE (LIKE A BRISK WALK)?: 3 DAYS

## 2025-08-24 SDOH — ECONOMIC STABILITY: TRANSPORTATION INSECURITY
IN THE PAST 12 MONTHS, HAS THE LACK OF TRANSPORTATION KEPT YOU FROM MEDICAL APPOINTMENTS OR FROM GETTING MEDICATIONS?: NO

## 2025-08-24 SDOH — HEALTH STABILITY: PHYSICAL HEALTH: ON AVERAGE, HOW MANY MINUTES DO YOU ENGAGE IN EXERCISE AT THIS LEVEL?: 90 MIN

## 2025-08-24 SDOH — ECONOMIC STABILITY: TRANSPORTATION INSECURITY
IN THE PAST 12 MONTHS, HAS LACK OF TRANSPORTATION KEPT YOU FROM MEETINGS, WORK, OR FROM GETTING THINGS NEEDED FOR DAILY LIVING?: NO

## 2025-08-24 SDOH — ECONOMIC STABILITY: HOUSING INSECURITY
IN THE LAST 12 MONTHS, WAS THERE A TIME WHEN YOU DID NOT HAVE A STEADY PLACE TO SLEEP OR SLEPT IN A SHELTER (INCLUDING NOW)?: NO

## 2025-08-24 SDOH — ECONOMIC STABILITY: TRANSPORTATION INSECURITY
IN THE PAST 12 MONTHS, HAS LACK OF RELIABLE TRANSPORTATION KEPT YOU FROM MEDICAL APPOINTMENTS, MEETINGS, WORK OR FROM GETTING THINGS NEEDED FOR DAILY LIVING?: NO

## 2025-08-24 ASSESSMENT — SOCIAL DETERMINANTS OF HEALTH (SDOH)
HOW OFTEN DO YOU GET TOGETHER WITH FRIENDS OR RELATIVES?: ONCE A WEEK
IN A TYPICAL WEEK, HOW MANY TIMES DO YOU TALK ON THE PHONE WITH FAMILY, FRIENDS, OR NEIGHBORS?: ONCE A WEEK
HOW HARD IS IT FOR YOU TO PAY FOR THE VERY BASICS LIKE FOOD, HOUSING, MEDICAL CARE, AND HEATING?: NOT HARD AT ALL
IN THE PAST 12 MONTHS, HAS THE ELECTRIC, GAS, OIL, OR WATER COMPANY THREATENED TO SHUT OFF SERVICE IN YOUR HOME?: NO
HOW OFTEN DO YOU ATTEND CHURCH OR RELIGIOUS SERVICES?: NEVER
HOW OFTEN DO YOU GET TOGETHER WITH FRIENDS OR RELATIVES?: ONCE A WEEK
HOW OFTEN DO YOU ATTEND CHURCH OR RELIGIOUS SERVICES?: NEVER
HOW OFTEN DO YOU ATTENT MEETINGS OF THE CLUB OR ORGANIZATION YOU BELONG TO?: NEVER
IN A TYPICAL WEEK, HOW MANY TIMES DO YOU TALK ON THE PHONE WITH FAMILY, FRIENDS, OR NEIGHBORS?: ONCE A WEEK
WITHIN THE PAST 12 MONTHS, YOU WORRIED THAT YOUR FOOD WOULD RUN OUT BEFORE YOU GOT THE MONEY TO BUY MORE: NEVER TRUE
DO YOU BELONG TO ANY CLUBS OR ORGANIZATIONS SUCH AS CHURCH GROUPS UNIONS, FRATERNAL OR ATHLETIC GROUPS, OR SCHOOL GROUPS?: NO
HOW MANY DRINKS CONTAINING ALCOHOL DO YOU HAVE ON A TYPICAL DAY WHEN YOU ARE DRINKING: 1 OR 2
DO YOU BELONG TO ANY CLUBS OR ORGANIZATIONS SUCH AS CHURCH GROUPS UNIONS, FRATERNAL OR ATHLETIC GROUPS, OR SCHOOL GROUPS?: NO
HOW OFTEN DO YOU HAVE SIX OR MORE DRINKS ON ONE OCCASION: NEVER
HOW OFTEN DO YOU ATTENT MEETINGS OF THE CLUB OR ORGANIZATION YOU BELONG TO?: NEVER
HOW OFTEN DO YOU HAVE A DRINK CONTAINING ALCOHOL: 2-4 TIMES A MONTH

## 2025-08-24 ASSESSMENT — LIFESTYLE VARIABLES
HOW MANY STANDARD DRINKS CONTAINING ALCOHOL DO YOU HAVE ON A TYPICAL DAY: 1 OR 2
HOW OFTEN DO YOU HAVE SIX OR MORE DRINKS ON ONE OCCASION: NEVER
SKIP TO QUESTIONS 9-10: 1
AUDIT-C TOTAL SCORE: 2
HOW OFTEN DO YOU HAVE A DRINK CONTAINING ALCOHOL: 2-4 TIMES A MONTH

## 2025-08-27 ENCOUNTER — OFFICE VISIT (OUTPATIENT)
Dept: MEDICAL GROUP | Age: 40
End: 2025-08-27
Payer: COMMERCIAL

## 2025-08-27 VITALS
BODY MASS INDEX: 21.98 KG/M2 | DIASTOLIC BLOOD PRESSURE: 62 MMHG | OXYGEN SATURATION: 100 % | SYSTOLIC BLOOD PRESSURE: 114 MMHG | HEART RATE: 60 BPM | WEIGHT: 157 LBS | TEMPERATURE: 97.6 F | HEIGHT: 71 IN

## 2025-08-27 DIAGNOSIS — Z13.29 THYROID DISORDER SCREEN: ICD-10-CM

## 2025-08-27 DIAGNOSIS — Z76.89 ENCOUNTER TO ESTABLISH CARE: Primary | ICD-10-CM

## 2025-08-27 DIAGNOSIS — Z13.0 SCREENING FOR DEFICIENCY ANEMIA: ICD-10-CM

## 2025-08-27 DIAGNOSIS — Z13.1 SCREENING FOR DIABETES MELLITUS: ICD-10-CM

## 2025-08-27 DIAGNOSIS — Z11.4 SCREENING FOR HIV (HUMAN IMMUNODEFICIENCY VIRUS): ICD-10-CM

## 2025-08-27 DIAGNOSIS — Z11.59 NEED FOR HEPATITIS C SCREENING TEST: ICD-10-CM

## 2025-08-27 DIAGNOSIS — Z13.228 SCREENING FOR METABOLIC DISORDER: ICD-10-CM

## 2025-08-27 DIAGNOSIS — R07.89 CHEST WALL DISCOMFORT: ICD-10-CM

## 2025-08-27 DIAGNOSIS — R79.89 LOW VITAMIN D LEVEL: ICD-10-CM

## 2025-08-27 DIAGNOSIS — Z13.220 LIPID SCREENING: ICD-10-CM

## 2025-08-27 PROBLEM — E65 FAT PAD SYNDROME: Status: RESOLVED | Noted: 2021-09-23 | Resolved: 2025-08-27

## 2025-08-27 ASSESSMENT — LIFESTYLE VARIABLES
HOW OFTEN DO YOU HAVE SIX OR MORE DRINKS ON ONE OCCASION: NEVER
SKIP TO QUESTIONS 9-10: 1
HOW OFTEN DO YOU HAVE A DRINK CONTAINING ALCOHOL: 2-4 TIMES A MONTH
AUDIT-C TOTAL SCORE: 2
HOW MANY STANDARD DRINKS CONTAINING ALCOHOL DO YOU HAVE ON A TYPICAL DAY: 1 OR 2
HOW MANY STANDARD DRINKS CONTAINING ALCOHOL DO YOU HAVE ON A TYPICAL DAY: 1 OR 2
AUDIT-C TOTAL SCORE: 2
HOW OFTEN DO YOU HAVE SIX OR MORE DRINKS ON ONE OCCASION: NEVER
HOW OFTEN DO YOU HAVE A DRINK CONTAINING ALCOHOL: 2-4 TIMES A MONTH
SKIP TO QUESTIONS 9-10: 1

## 2025-08-27 ASSESSMENT — PATIENT HEALTH QUESTIONNAIRE - PHQ9: CLINICAL INTERPRETATION OF PHQ2 SCORE: 0
